# Patient Record
Sex: MALE | Race: OTHER | Employment: UNEMPLOYED | URBAN - METROPOLITAN AREA
[De-identification: names, ages, dates, MRNs, and addresses within clinical notes are randomized per-mention and may not be internally consistent; named-entity substitution may affect disease eponyms.]

---

## 2017-01-26 ENCOUNTER — HOSPITAL ENCOUNTER (OUTPATIENT)
Dept: LAB | Age: 57
Discharge: HOME | End: 2017-01-26
Payer: MEDICARE

## 2017-01-26 DIAGNOSIS — R19.7 DIARRHEA: ICD-10-CM

## 2017-01-26 LAB
BACTERIA SPEC CULT: NORMAL
SERVICE CMNT-IMP: NORMAL

## 2017-01-26 PROCEDURE — 87493 C DIFF AMPLIFIED PROBE: CPT | Performed by: INTERNAL MEDICINE

## 2017-07-16 ENCOUNTER — HOSPITAL ENCOUNTER (EMERGENCY)
Age: 57
Discharge: HOME | End: 2017-07-16
Attending: EMERGENCY MEDICINE
Payer: MEDICARE

## 2017-07-16 VITALS
HEART RATE: 89 BPM | RESPIRATION RATE: 18 BRPM | HEIGHT: 67 IN | OXYGEN SATURATION: 96 % | SYSTOLIC BLOOD PRESSURE: 124 MMHG | WEIGHT: 173 LBS | TEMPERATURE: 97.8 F | DIASTOLIC BLOOD PRESSURE: 87 MMHG | BODY MASS INDEX: 27.15 KG/M2

## 2017-07-16 DIAGNOSIS — B35.6 TINEA CRURIS: Primary | ICD-10-CM

## 2017-07-16 PROCEDURE — 99282 EMERGENCY DEPT VISIT SF MDM: CPT

## 2017-07-16 RX ORDER — NYSTATIN 100000 U/G
OINTMENT TOPICAL 2 TIMES DAILY
Qty: 15 G | Refills: 0 | Status: SHIPPED | OUTPATIENT
Start: 2017-07-16 | End: 2022-05-18 | Stop reason: ALTCHOICE

## 2017-07-16 NOTE — ED PROVIDER NOTES
HPI Comments: The patient is a 62 y.o. male who presents with a possible allergic reaction. The patient was started on Amoxicillin 5 days ago for sinusitis and developed a rash on his scrotum that has spread. He also complains of a sore and itchy throat and tongue. Patient is a 62 y.o. male presenting with allergic reaction and sore throat. The history is provided by the patient. No  was used. Allergic Reaction    Pertinent negatives include no shortness of breath. Sore Throat    Pertinent negatives include no congestion, no drooling, no ear discharge, no ear pain, no shortness of breath, no stridor, no trouble swallowing and no cough. Past Medical History:   Diagnosis Date    Asthma        History reviewed. No pertinent surgical history. History reviewed. No pertinent family history. Social History     Social History    Marital status: LEGALLY      Spouse name: N/A    Number of children: N/A    Years of education: N/A     Occupational History    Not on file. Social History Main Topics    Smoking status: Never Smoker    Smokeless tobacco: Never Used    Alcohol use Not on file    Drug use: Not on file    Sexual activity: Not on file     Other Topics Concern    Not on file     Social History Narrative         ALLERGIES: Effexor [venlafaxine]; Seroquel [quetiapine]; Zocor [simvastatin]; Amoxicillin; Motrin [ibuprofen]; and Tramadol    Review of Systems   Constitutional: Negative for activity change, appetite change, chills, diaphoresis, fatigue, fever and unexpected weight change. HENT: Positive for sinus pressure and sore throat. Negative for congestion, dental problem, drooling, ear discharge, ear pain, facial swelling, hearing loss, mouth sores, nosebleeds, postnasal drip, rhinorrhea, sneezing, tinnitus, trouble swallowing and voice change. Respiratory: Positive for chest tightness.  Negative for apnea, cough, choking, shortness of breath, wheezing and stridor. Skin: Positive for rash. Negative for color change, pallor and wound. Vitals:    07/16/17 1743   BP: 124/87   Pulse: 89   Resp: 18   Temp: 97.8 °F (36.6 °C)   SpO2: 96%   Weight: 78.5 kg (173 lb)   Height: 5' 7\" (1.702 m)            Physical Exam   Constitutional: He is oriented to person, place, and time. He appears well-developed and well-nourished. Alert and appropriate, anxious but in no apparent marked discomfort or acute respiratory distress   HENT:   Head: Normocephalic and atraumatic. Right Ear: External ear normal.   Left Ear: External ear normal.   Mouth/Throat: Oropharynx is clear and moist. No oropharyngeal exudate. Eyes: Conjunctivae and EOM are normal. Pupils are equal, round, and reactive to light. Right eye exhibits no discharge. Left eye exhibits no discharge. No scleral icterus. Neck: Normal range of motion. Neck supple. No tracheal deviation present. No thyromegaly present. Cardiovascular: Normal rate, regular rhythm, normal heart sounds and intact distal pulses. No murmur heard. Pulmonary/Chest: Effort normal and breath sounds normal. No respiratory distress. He has no wheezes. He has no rales. Abdominal: Soft. Bowel sounds are normal. He exhibits no distension. There is no tenderness. There is no rebound and no guarding. Musculoskeletal: Normal range of motion. He exhibits no edema or tenderness. Lymphadenopathy:     He has no cervical adenopathy. Neurological: He is alert and oriented to person, place, and time. No cranial nerve deficit. Coordination normal.   Skin: Skin is warm. Rash noted. No erythema. Erythematous, moist and scaling rash in groin; no pus or marked tenderness, no urticaria   Psychiatric: He has a normal mood and affect. His behavior is normal. Judgment and thought content normal.   Nursing note and vitals reviewed.        MDM  Number of Diagnoses or Management Options  Tinea cruris:   Diagnosis management comments: Patient very anxious about an allergic reaction to antibiotic but with a very reassuring examination without signs of angioedema, urticaria, or bronchospasm. No indication for antihistamine therapy. Rash is c/w tinea cruris without signs of secondary infection or diabetes. Patient has already discontinued antibiotic and agrees with topical therapy for rash. Risk of Complications, Morbidity, and/or Mortality  Presenting problems: moderate  Diagnostic procedures: low  Management options: moderate    Patient Progress  Patient progress: stable    ED Course   Patient hemodynamically stable without shortness of breath or spreading rash. Precautions given. Procedures      Vitals:  Patient Vitals for the past 12 hrs:   Temp Pulse Resp BP SpO2   07/16/17 1743 97.8 °F (36.6 °C) 89 18 124/87 96 %       Disposition:  Diagnosis:   1. Tinea cruris        Disposition: Discharge Home    Follow-up Information     Follow up With Details Comments Contact [facility] EMERGENCY DEPT  As needed, If symptoms worsen [address]  [phone]    Latrell Carrero MD In 3 days As needed, If symptoms are not improving Patient can only remember the practice name and not the physician             Patient's Medications   Start Taking    NYSTATIN (MYCOSTATIN) 100,000 UNIT/GRAM OINTMENT    Apply  to affected area two (2) times a day. Continue Taking    ALBUTEROL (PROVENTIL HFA, VENTOLIN HFA) 90 MCG/ACTUATION INHALER    Take  by inhalation. FLUOXETINE (PROZAC) 20 MG CAPSULE    Take  by mouth daily. LAMOTRIGINE (LAMICTAL) 25 MG TABLET    Take  by mouth daily. OMEPRAZOLE (PRILOSEC) 20 MG CAPSULE    Take 20 mg by mouth daily. TAMSULOSIN (FLOMAX) 0.4 MG CAPSULE    Take 0.4 mg by mouth daily.    These Medications have changed    No medications on file   Stop Taking    No medications on file         [facility] [address] acting as a scribe for and in the presence of Kevin Solo MD      July 16, 2017 at 5:57 PM       Provider Attestation:      I personally performed the services described in the documentation, reviewed the documentation, as recorded by the scribe in my presence, and it accurately and completely records my words and actions.      Iman Platt MD      Signed by: Gibson Pemberton, July 16, 2017 at 5:57 PM

## 2017-07-16 NOTE — DISCHARGE INSTRUCTIONS
Jock Itch: Care Instructions  Your Care Instructions  Jock itch is a fungal infection of the groin. The fungus that causes jock itch lives on your skin. It often affects male athletes, but anyone can get jock itch. You may get an itchy rash on your inner thighs and rear end (buttocks). It spreads and starts to itch when you sweat or are in steamy showers or locker rooms. Jock itch should end soon if you keep your skin dry after you clean it. You can treat jock itch at home with antifungal creams and powders that you can buy without a prescription. Follow-up care is a key part of your treatment and safety. Be sure to make and go to all appointments, and call your doctor if you are having problems. It's also a good idea to know your test results and keep a list of the medicines you take. How can you care for yourself at home? · Wash the rash with soap and water. · Wet a soft cloth with cool water alone or mixed with baking soda or essential oils such as lavender or dionicio. Put the cool compress on the skin to relieve itching. · If you have large areas of blisters or sores, use compresses such as those made with Burow's solution (available without a prescription) to soothe and dry out the blisters. · Spread antifungal cream or powder over, and beyond the edge of, the rash. Follow the directions on the package. · If your doctor prescribed medicine, take it exactly as directed. Call your doctor if you have any problems with your medicine. · Try not to scratch the rash. · Shower or bathe daily and after you exercise. · Keep your skin dry as much as possible to allow it to heal.  · Until your jock itch is cured, wear loose-fitting cotton clothing. Avoid tight underwear, pants, and panty hose. · Wash your supporters and shorts after every wearing. · Do not share clothing, sports equipment, towels, or sheets to avoid spreading the fungi to other people.   To prevent jock itch  · Put on socks before you put on underwear if you have athlete's foot. This action helps prevent the fungus on your feet from spreading to your groin. · Wash your workout clothes, underwear, socks, and towels after each use. · Keep your groin, inner thighs, and buttocks clean and dry, especially after you exercise and shower. · Use a powder on your groin, inner thighs, and buttocks to help keep these areas dry. · Do not borrow or lend clothing, sports equipment, towels, or sheets. · Wear slippers or sandals in locker rooms, showers, and public bathing areas. When should you call for help? Call your doctor now or seek immediate medical care if:  · You have signs of infection, such as:  ¨ Increased pain, swelling, warmth, or redness. ¨ Red streaks leading from the rash. ¨ Pus draining from the rash. ¨ A fever. Watch closely for changes in your health, and be sure to contact your doctor if:  · You do not get better as expected. Where can you learn more? Go to http://benedicto-marisabel.info/. Enter G303 in the search box to learn more about \"Jock Itch: Care Instructions. \"  Current as of: October 13, 2016  Content Version: 11.3  © 8495-3524 [employer]. Care instructions adapted under license by [employer] (which disclaims liability or warranty for this information). If you have questions about a medical condition or this instruction, always ask your healthcare professional. [place] [address] any warranty or liability for your use of this information.

## 2023-02-24 ENCOUNTER — HOSPITAL ENCOUNTER (EMERGENCY)
Facility: HOSPITAL | Age: 63
Discharge: HOME | End: 2023-02-24
Attending: EMERGENCY MEDICINE
Payer: MEDICARE

## 2023-02-24 VITALS
HEART RATE: 71 BPM | WEIGHT: 181 LBS | HEIGHT: 69 IN | SYSTOLIC BLOOD PRESSURE: 131 MMHG | TEMPERATURE: 98.2 F | OXYGEN SATURATION: 100 % | RESPIRATION RATE: 18 BRPM | DIASTOLIC BLOOD PRESSURE: 84 MMHG | BODY MASS INDEX: 26.81 KG/M2

## 2023-02-24 DIAGNOSIS — F41.1 ANXIETY STATE: Primary | ICD-10-CM

## 2023-02-24 LAB
ANION GAP SERPL CALC-SCNC: 4 MMOL/L (ref 3–18)
BASOPHILS # BLD: 0.1 K/UL (ref 0–0.1)
BASOPHILS NFR BLD: 1 % (ref 0–2)
BUN SERPL-MCNC: 18 MG/DL (ref 7–18)
BUN/CREAT SERPL: 16 (ref 12–20)
CALCIUM SERPL-MCNC: 9.1 MG/DL (ref 8.5–10.1)
CHLORIDE SERPL-SCNC: 102 MMOL/L (ref 100–111)
CO2 SERPL-SCNC: 29 MMOL/L (ref 21–32)
CREAT SERPL-MCNC: 1.16 MG/DL (ref 0.6–1.3)
DIFFERENTIAL METHOD BLD: ABNORMAL
EOSINOPHIL # BLD: 0.4 K/UL (ref 0–0.4)
EOSINOPHIL NFR BLD: 4 % (ref 0–5)
ERYTHROCYTE [DISTWIDTH] IN BLOOD BY AUTOMATED COUNT: 12.8 % (ref 11.6–14.5)
GLUCOSE SERPL-MCNC: 86 MG/DL (ref 74–99)
HCT VFR BLD AUTO: 38.8 % (ref 36–48)
HGB BLD-MCNC: 12.7 G/DL (ref 13–16)
IMM GRANULOCYTES # BLD AUTO: 0 K/UL (ref 0–0.04)
IMM GRANULOCYTES NFR BLD AUTO: 0 % (ref 0–0.5)
LYMPHOCYTES # BLD: 3.8 K/UL (ref 0.9–3.6)
LYMPHOCYTES NFR BLD: 38 % (ref 21–52)
MCH RBC QN AUTO: 27.5 PG (ref 24–34)
MCHC RBC AUTO-ENTMCNC: 32.7 G/DL (ref 31–37)
MCV RBC AUTO: 84.2 FL (ref 78–100)
MONOCYTES # BLD: 0.9 K/UL (ref 0.05–1.2)
MONOCYTES NFR BLD: 9 % (ref 3–10)
NEUTS SEG # BLD: 4.7 K/UL (ref 1.8–8)
NEUTS SEG NFR BLD: 48 % (ref 40–73)
NRBC # BLD: 0 K/UL (ref 0–0.01)
NRBC BLD-RTO: 0 PER 100 WBC
PLATELET # BLD AUTO: 234 K/UL (ref 135–420)
PMV BLD AUTO: 10.9 FL (ref 9.2–11.8)
POTASSIUM SERPL-SCNC: 4.2 MMOL/L (ref 3.5–5.5)
RBC # BLD AUTO: 4.61 M/UL (ref 4.35–5.65)
SODIUM SERPL-SCNC: 135 MMOL/L (ref 136–145)
TROPONIN I SERPL HS-MCNC: 4 NG/L (ref 0–78)
WBC # BLD AUTO: 9.8 K/UL (ref 4.6–13.2)

## 2023-02-24 PROCEDURE — 85025 COMPLETE CBC W/AUTO DIFF WBC: CPT

## 2023-02-24 PROCEDURE — 99284 EMERGENCY DEPT VISIT MOD MDM: CPT | Performed by: EMERGENCY MEDICINE

## 2023-02-24 PROCEDURE — 84484 ASSAY OF TROPONIN QUANT: CPT

## 2023-02-24 PROCEDURE — 93005 ELECTROCARDIOGRAM TRACING: CPT | Performed by: EMERGENCY MEDICINE

## 2023-02-24 PROCEDURE — 80048 BASIC METABOLIC PNL TOTAL CA: CPT

## 2023-02-25 LAB
EKG ATRIAL RATE: 68 BPM
EKG DIAGNOSIS: NORMAL
EKG P AXIS: 53 DEGREES
EKG P-R INTERVAL: 144 MS
EKG Q-T INTERVAL: 422 MS
EKG QRS DURATION: 94 MS
EKG QTC CALCULATION (BAZETT): 448 MS
EKG R AXIS: 17 DEGREES
EKG T AXIS: 38 DEGREES
EKG VENTRICULAR RATE: 68 BPM

## 2023-02-25 ASSESSMENT — ENCOUNTER SYMPTOMS
BLOOD IN STOOL: 0
STRIDOR: 0
NAUSEA: 0
ABDOMINAL PAIN: 0
SHORTNESS OF BREATH: 0
WHEEZING: 0
VOMITING: 0

## 2023-02-25 NOTE — ED NOTES
EMERGENCY DEPARTMENT HISTORY AND PHYSICAL EXAM    3:29 AM      Date: 2/24/2023  Patient Name: Tom Lutz    History of Presenting Illness     Chief Complaint   Patient presents with    Irregular Heart Beat         History Provided By: Patient. Location/Duration/Severity/Modifying factors   HPI  80-year-old male with only significant past medical history of asthma and recently diagnosed prostate cancer, here for palpitations described as \"racing heart. \"  Reports palpitations have occurring for the past few days, cannot recall inciting events. Patient reports that he was also having palpitations during triage EKG. PCP: Chris Marcos MD    No current facility-administered medications for this encounter. Current Outpatient Medications   Medication Sig Dispense Refill    cyclobenzaprine (FLEXERIL) 10 MG tablet Take 10 mg by mouth 3 times daily      loratadine (CLARITIN) 10 MG tablet Take 10 mg by mouth daily      LORazepam (ATIVAN) 1 MG tablet Take 1 mg by mouth.       cetirizine (ZYRTEC) 10 MG tablet Take 10 mg by mouth daily      acetaminophen (TYLENOL) 500 MG tablet Take 500 mg by mouth every 4 hours as needed      albuterol sulfate HFA (PROVENTIL;VENTOLIN;PROAIR) 108 (90 Base) MCG/ACT inhaler Inhale 180 mcg into the lungs      diazePAM (VALIUM) 10 MG tablet TAKE 1 TABLET BY MOUTH 4 TIMES DAILY AS NEEDED      FLUoxetine (PROZAC) 40 MG capsule Take 40 mg by mouth 2 times daily      fluticasone (FLONASE) 50 MCG/ACT nasal spray 1 spray by Nasal route daily      levoFLOXacin (LEVAQUIN) 750 MG tablet Take 750 mg by mouth daily      montelukast (SINGULAIR) 10 MG tablet ceived the following from [facility]: Outside name: montelukast (SINGULAIR) 10 mg tablet      omeprazole (PRILOSEC) 20 MG delayed release capsule Take 20 mg by mouth daily      ondansetron (ZOFRAN-ODT) 4 MG disintegrating tablet DISSOLVE 1 TABLET IN MOUTH THREE TIMES DAILY FOR 30 DAYS      tamsulosin (FLOMAX) 0.4 MG capsule Take 0.4 mg by mouth daily         Past History     Past Medical History:  Past Medical History:   Diagnosis Date    Asthma     Bipolar disorder ([address]     BPH (benign prostatic hyperplasia)     Elevated PSA     GERD (gastroesophageal reflux disease)     Hypercholesteremia     PTSD (post-traumatic stress disorder)        Past Surgical History:  Past Surgical History:   Procedure Laterality Date    BREAST BIOPSY      UROLOGICAL SURGERY  12/15/2022    TRANSRECTAL ULTRASOUND AND BIOPSY OF PROSTATE; Jim Sandy NP       Family History:  Family History   Problem Relation Age of Onset    Diabetes Mother     Asthma Mother     Hypertension Mother     High Cholesterol Mother     Cancer Mother        Social History:  Social History     Tobacco Use    Smoking status: Never    Smokeless tobacco: Never       Allergies: Allergies   Allergen Reactions    Butorphanol Other (See Comments)    Quetiapine Anaphylaxis    Simvastatin Anaphylaxis    Venlafaxine Anaphylaxis    Butalbital-Apap-Caffeine Other (See Comments)    Doxycycline Hyclate Other (See Comments)    Famotidine Other (See Comments)    Ibuprofen Hives    Mirtazapine Other (See Comments)    Promethazine Other (See Comments)    Sucralfate Other (See Comments)    Tramadol Other (See Comments)    Amoxicillin Rash         Review of Systems     Review of Systems   Constitutional:  Negative for chills, fever and unexpected weight change. Respiratory:  Negative for shortness of breath, wheezing and stridor. Cardiovascular:  Positive for palpitations. Negative for chest pain and leg swelling. Gastrointestinal:  Negative for abdominal pain, blood in stool, nausea and vomiting. Genitourinary:  Negative for dysuria. Neurological:  Negative for syncope. Physical Exam   /84   Pulse 71   Temp 98.2 °F (36.8 °C) (Oral)   Resp 18   Ht 5' 9\" (1.753 m)   Wt 181 lb (82.1 kg)   SpO2 100%   BMI 26.73 kg/m²     Physical Exam  Vitals and nursing note reviewed. Constitutional:       General: He is not in acute distress. Appearance: He is not ill-appearing or toxic-appearing. Cardiovascular:      Rate and Rhythm: Normal rate and regular rhythm. Pulses: Normal pulses. Heart sounds: Normal heart sounds. No murmur heard. No friction rub. No gallop. Pulmonary:      Effort: Pulmonary effort is normal. No respiratory distress. Breath sounds: Normal breath sounds. No stridor. No wheezing, rhonchi or rales. Neurological:      General: No focal deficit present. Mental Status: He is alert.      Diagnostic Study Results     Labs -  Recent Results (from the past 12 hour(s))   Basic Metabolic Panel    Collection Time: 02/24/23  7:32 PM   Result Value Ref Range    Sodium 135 (L) 136 - 145 mmol/L    Potassium 4.2 3.5 - 5.5 mmol/L    Chloride 102 100 - 111 mmol/L    CO2 29 21 - 32 mmol/L    Anion Gap 4 3.0 - 18 mmol/L    Glucose 86 74 - 99 mg/dL    BUN 18 7.0 - 18 MG/DL    Creatinine 1.16 0.6 - 1.3 MG/DL    Bun/Cre Ratio 16 12 - 20      Est, Glom Filt Rate >60 >60 ml/min/1.73m2    Calcium 9.1 8.5 - 10.1 MG/DL   CBC with Auto Differential    Collection Time: 02/24/23  7:32 PM   Result Value Ref Range    WBC 9.8 4.6 - 13.2 K/uL    RBC 4.61 4.35 - 5.65 M/uL    Hemoglobin 12.7 (L) 13.0 - 16.0 g/dL    Hematocrit 38.8 36.0 - 48.0 %    MCV 84.2 78.0 - 100.0 FL    MCH 27.5 24.0 - 34.0 PG    MCHC 32.7 31.0 - 37.0 g/dL    RDW 12.8 11.6 - 14.5 %    Platelets 407 539 - 543 K/uL    MPV 10.9 9.2 - 11.8 FL    Nucleated RBCs 0.0 0  WBC    nRBC 0.00 0.00 - 0.01 K/uL    Seg Neutrophils 48 40 - 73 %    Lymphocytes 38 21 - 52 %    Monocytes 9 3 - 10 %    Eosinophils % 4 0 - 5 %    Basophils 1 0 - 2 %    Immature Granulocytes 0 0.0 - 0.5 %    Segs Absolute 4.7 1.8 - 8.0 K/UL    Absolute Lymph # 3.8 (H) 0.9 - 3.6 K/UL    Absolute Mono # 0.9 0.05 - 1.2 K/UL    Absolute Eos # 0.4 0.0 - 0.4 K/UL    Basophils Absolute 0.1 0.0 - 0.1 K/UL    Absolute Immature Granulocyte 0.0 0.00 - 0.04 K/UL    Differential Type AUTOMATED     Troponin    Collection Time: 02/24/23  7:32 PM   Result Value Ref Range    Troponin, High Sensitivity 4 0 - 78 ng/L   EKG 12 Lead    Collection Time: 02/24/23  7:46 PM   Result Value Ref Range    Ventricular Rate 68 BPM    Atrial Rate 68 BPM    P-R Interval 144 ms    QRS Duration 94 ms    Q-T Interval 422 ms    QTc Calculation (Bazett) 448 ms    P Axis 53 degrees    R Axis 17 degrees    T Axis 38 degrees    Diagnosis Normal sinus rhythm        Radiologic Studies -   No orders to display         Medical Decision Making   I am the first provider for this patient. I reviewed the vital signs, available nursing notes, past medical history, past surgical history, family history and social history. Vital Signs-Reviewed the patient's vital signs. EKG: Normal Sinus Rhythm    Records Reviewed: (Time of Review: 3:29 AM)    ED Course: Progress Notes, Reevaluation, and Consults:     129 AM 78-year-old male with only significant past medical history of asthma and recently diagnosed prostate cancer, here for palpitations described as \"racing heart. \"EKG shows normal sinus rhythm with no evidence of ST elevations. Low concern for ACS, heart score is 0 pending troponin. Low concern for pulmonary embolism low risk Wells score, negative PERC. Will follow with triage labs, and likely discharge if no abnormalities. 3:29 AM On chart review, patient had a exercise stress test on October 5, 2022 no concerning findings. 3:29 AM Reviewed labs, no electrolyte abnormalities, troponin not elevated. EKG did not show any concerning findings. Otherwise, patient's work-up was unremarkable, he is well-appearing, stable vitals, no acute processes suspected, appropriate for discharge. J Luis Wilcox MD-PhD  Emergency Medicine PGY-1    Provider Notes (Medical Decision Making):   Medical Decision Making  Amount and/or Complexity of Data Reviewed  Labs: ordered.   ECG/medicine tests: ordered. Procedures    Critical Care Time: None. Diagnosis     Clinical Impression:   1. Anxiety state        Disposition: Home. @[facility]@     @[facility]  Disclaimer: Sections of this note are dictated using utilizing voice recognition software. Minor typographical errors may be present. If questions arise, please do not hesitate to contact me or call our department.         Loann Boas, MD  Resident  02/25/23 0133

## 2023-02-25 NOTE — ED NOTES
PIV was removed by Provider and pt was discharged by Provider.       Vena Sandhoff, RN  02/25/23 1806

## 2023-02-25 NOTE — ED TRIAGE NOTES
Pt arrives ambulatory to triage with complaints of intermittent heart palpitations. States, \"my heart feels as if it's beating and stops. I don't know what is going on. \"  States symptoms have been intermittent x1 week. Seen at Urgent Care last week for same. Reports he had an EKG completed, was told to hydrate and relax, and was sent home. Denies any chest pain/SOB. Denies N/V/D. States he was dx with Prostate Cancer in Sept 2022. Scheduled for surgery 3/15/23.        Past Medical History:   Diagnosis Date    Asthma     Bipolar disorder ([facility] [address]     BPH (benign prostatic hyperplasia)     Elevated PSA     GERD (gastroesophageal reflux disease)     Hypercholesteremia     PTSD (post-traumatic stress disorder)

## 2023-02-25 NOTE — ED NOTES
20G PIV placed to pt's L AC. Blood work collected, labeled, and sent to lab for processing.       Balwinder Reid RN  02/24/23 1955

## 2023-02-25 NOTE — DISCHARGE INSTRUCTIONS
You were seen in the ED for palpitations. Based on our evaluation we were reassured, we did not find any acute processes at this time. Please return to the ED for worsening symptoms or any other reasons of concern.